# Patient Record
(demographics unavailable — no encounter records)

---

## 2017-01-25 NOTE — ED.PDOC
History of Present Illness





- General


Chief Complaint: General


Stated Complaint: Feels hot, but denies fever


Time Seen by Provider: 17 18:17


Source: patient


Exam Limitations: no limitations





- History of Present Illness


Initial Comments: 


Patient presents with a complaint of "hot flashes" this past week. She says she 

feels hot in her face with occasional mild headache. She felt "light-headed" 

yesterday after standing.  + subjective fever. No nasal exudates nor 

congestion. No ST.  Denies coughing or sneezing.  No sick contacts. No other 

complaints. 


Timing/Duration: 1 week


Severity: mild


Improving Factors: nothing


Worsening Factors: nothing


Associated Symptoms: denies symptoms


Allergies/Adverse Reactions: 


Allergies





NO KNOWN ALLERGY Allergy (Verified 13 11:15)


 











Review of Systems





- Review of Systems


Constitutional: States: no symptoms reported


EENTM: States: no symptoms reported


Respiratory: States: no symptoms reported


Cardiology: States: no symptoms reported


Gastrointestinal/Abdominal: States: no symptoms reported


Genitourinary: States: no symptoms reported


Musculoskeletal: States: no symptoms reported


Skin: States: no symptoms reported


Neurological: States: no symptoms reported


Endocrine: States: no symptoms reported


Hematologic/Lymphatic: States: no symptoms reported





Past Medical History (General)





- Patient Medical History


Hx Seizures: No


Hx Stroke: No


Hx Asthma: No


Hx of COPD: No


Hx Cardiac Disorders: No


Hx Congestive Heart Failure: No


Hx Pacemaker: No


Hx Hypertension: No


Hx Diabetes: No


Hx Cancer: No


Hx Hepatitis C: No


Hx MRSA: No





- Vaccination History


Hx Tetanus, Diphtheria Vaccination: No


Hx Influenza Vaccination: No


Hx Pneumococcal Vaccination: No


Immunizations Up to Date: Yes





- Social History


Hx Tobacco Use: No


Hx Chewing Tobacco Use: No


Hx Alcohol Use: Yes - Occas


Hx Substance Use: No


Hx Substance Use Treatment: No


Hx Depression: No


Feels Threatened In Home Enviroment: No


Feels Threatened In a Relationship: No


Hx Physical Abuse: No


Hx Emotional Abuse: No


Hx Suspected Abuse: No





- Activities of Daily Living


Hospice Agency (if applicable):: None





- Female History


Patient is a Female of Child Bearing Age (10 -59 yrs old): Yes


Hx Last Menstrual Period: 14


Patient Pregnant: No





Family Medical History





- Family History


  ** Father


Family History: Unknown


Living Status: 


Age at Death (years of age): 59


Hx Family Cancer: Yes





Physical Exam





- Physical Exam


General Appearance: Alert


Eye Exam: bilateral normal


Ears, Nose, Throat: normal ENT inspection


Neck: non-tender, full range of motion, supple


Respiratory: lungs clear


Cardiovascular/Chest: normal peripheral pulses, regular rate, rhythm, no edema


Gastrointestinal/Abdominal: normal bowel sounds, non tender, soft


Rectal Exam: normal exam


Back Exam: normal inspection, no CVA tenderness


Extremity: normal range of motion, non-tender, normal inspection


Neurologic: CNs II-XII nml as tested, no motor/sensory deficits, alert, normal 

mood/affect, oriented x 3


Skin Exam: normal color


Lymphatic: no adenopathy





Progress





- Progress


Progress: 





17 20:09


Labs unremarkable.  Patient referred to outpatient physician for further workup 

of hot flashes. 





 Laboratory Tests











  17





  18:40 19:45


 


WBC  6.8 


 


RBC  4.45 


 


Hgb  13.5 


 


Hct  40.4 


 


MCV  90.7 


 


MCH  30.3 


 


MCHC  33.4 


 


RDW  12.8 


 


Plt Count  217 


 


MPV  9.4 


 


Absolute Neuts (auto)  3.90 


 


Absolute Lymphs (auto)  2.20 


 


Absolute Monos (auto)  0.60 


 


Absolute Eos (auto)  0.10 


 


Absolute Basos (auto)  0.00 


 


Neutrophils %  57.1 


 


Lymphocytes %  32.0 


 


Monocytes %  9.4 H 


 


Eosinophils %  1.1 


 


Basophils %  0.4 


 


Sodium  138 


 


Potassium  4.0 


 


Chloride  106 


 


Carbon Dioxide  27 


 


Anion Gap  9.0 L 


 


BUN  10 


 


Creatinine  0.70 


 


BUN/Creatinine Ratio  14.3 


 


Random Glucose  85 


 


Serum Osmolality  274.0 L 


 


Calcium  9.1 


 


Total Bilirubin  0.5 


 


AST  14 


 


ALT  11 


 


Alkaline Phosphatase  40 L 


 


Serum Total Protein  8.1 


 


Albumin  4.3 


 


Globulin  3.8 H 


 


Albumin/Globulin Ratio  1.1 


 


Serum HCG, Qual  Negative 


 


Urine Color   Yellow


 


Urine Appearance   Cloudy


 


Urine pH   6.5


 


Ur Specific Gravity   1.025


 


Urine Protein   Negative


 


Urine Glucose (UA)   Negative


 


Urine Ketones   Negative


 


Urine Blood   Moderate H


 


Urine Nitrite   Negative


 


Urine Bilirubin   Negative


 


Urine Urobilinogen   2.0 H


 


Ur Leukocyte Esterase   Negative


 


Urine RBC   0-1


 


Urine WBC   0


 


Ur Epithelial Cells   10-20


 


Urine Bacteria   0














Departure





- Departure


Clinical Impression: 


 The IMO content you are accessing is 14 months old.  To get updated IMO content

, please contact your IT Dept/Help Desk requesting the latest release. IT Dept/

Help Desk- Please refer to our FAQ page ([email or web address]

[email or web address] or contact [employer] Customer Support at [email or web address]


Disposition: Discharge to Home or Self Care


Condition: Good


Departure Forms:  ED Discharge - Pt. Copy, Patient Portal Self Enrollment


Diet: resume usual diet


Activity: increase activity as tolerated


Additional Instructions: 


Follow up with primary care physician for further workup of your hot flashes.

## 2017-02-06 NOTE — ED.PDOC
History of Present Illness





- General


Chief Complaint: General


Stated Complaint: Right flank pain


Time Seen by Provider: 17 08:42


Source: patient, RN notes reviewed





- History of Present Illness


Initial Comments: 


Patient is a 34 y/o female who has had right flank pain for the past 4 days.  

She was previously seen in the ED for hot flashes which she says she has when 

she has a UTI.  Since that time, she has had dysuria which has resolved, 

however she now has sever right flank pain which is sharp.  No fever/chills.


Timing/Duration: other - 4 days


Severity: severe


Improving Factors: nothing


Worsening Factors: movement


Associated Symptoms: denies symptoms


Allergies/Adverse Reactions: 


Allergies





NO KNOWN ALLERGY Allergy (Verified 13 11:15)


 








Home Medications: 


Ambulatory Orders





Ciprofloxacin [Cipro] 500 mg PO BID #14 tab 17 











Review of Systems





- Review of Systems


Constitutional: States: no symptoms reported


EENTM: States: no symptoms reported


Respiratory: States: no symptoms reported


Cardiology: States: no symptoms reported


Gastrointestinal/Abdominal: States: no symptoms reported


Genitourinary: States: dysuria, pain


Musculoskeletal: States: back pain


Skin: States: no symptoms reported


Neurological: States: no symptoms reported


Endocrine: States: other - hot flashes


Hematologic/Lymphatic: States: no symptoms reported


All other Systems: Reviewed and Negative





Past Medical History (General)





- Patient Medical History


Hx Seizures: No


Hx Stroke: No


Hx Asthma: No


Hx of COPD: No


Hx Cardiac Disorders: No


Hx Congestive Heart Failure: No


Hx Pacemaker: No


Hx Hypertension: No


Hx Diabetes: No


Hx Cancer: No


Hx Hepatitis C: No


Hx MRSA: No





- Vaccination History


Hx Tetanus, Diphtheria Vaccination: No


Hx Influenza Vaccination: No


Hx Pneumococcal Vaccination: No





- Social History


Hx Tobacco Use: No


Hx Chewing Tobacco Use: No


Hx Alcohol Use: Yes - Occas


Hx Substance Use: No


Hx Substance Use Treatment: No


Hx Depression: No


Hx Physical Abuse: No


Hx Emotional Abuse: No


Hx Suspected Abuse: No





- Female History


Hx Last Menstrual Period: 14


Patient Pregnant: No





Family Medical History





- Family History


  ** Father


Family History: Unknown


Living Status: 


Age at Death (years of age): 59


Hx Family Cancer: Yes





Physical Exam





- Physical Exam


General Appearance: Obvious distress


Ears, Nose, Throat: hearing grossly normal


Neck: full range of motion


Respiratory: lungs clear, normal breath sounds, no respiratory distress, no 

accessory muscle use


Cardiovascular/Chest: regular rate, rhythm, no edema, no gallop, no murmur


Gastrointestinal/Abdominal: normal bowel sounds, soft, no organomegaly, 

tenderness - right flank


Back Exam: CVA tenderness (R)


Extremity: normal range of motion, non-tender, normal inspection, no pedal edema


Skin Exam: normal color, warm/dry





Progress





- Results/Orders


Results/Orders: 


 





17 09:12


URINE CULTURE W/COLONY COUNT Stat 








 Laboratory Results











Serum HCG, Qual  Negative   17  09:00    


 


Urine Color  Yellow  (Yellow)   17  09:12    


 


Urine Appearance  Sl cloudy  (Clear)   17  09:12    


 


Urine pH  7.5  (4.5-7.8)   17  09:12    


 


Ur Specific Gravity  1.020  (1.005-1.030)   17  09:12    


 


Urine Protein  30 mg/dL  17  09:12    


 


Urine Glucose (UA)  Negative mg/dL (Negative)   17  09:12    


 


Urine Ketones  Negative mg/dL (NEGATIVE)   17  09:12    


 


Urine Blood  Trace-lysed  (Negative)  H  17  09:12    


 


Urine Nitrite  Positive  H  17  09:12    


 


Urine Bilirubin  Negative  (NEGATIVE)   17  09:12    


 


Urine Urobilinogen  0.2 mg/dL (0.2-1.0)   17  09:12    


 


Ur Leukocyte Esterase  Small  (Negative)  H  17  09:12    


 


Urine RBC  5-10 /hpf H  17  09:12    


 


Urine WBC  30-40 /hpf H  17  09:12    


 


Ur Epithelial Cells  1-3 /hpf  17  09:12    


 


Urine Bacteria  3+  H  17  09:12    














Departure





- Departure


Clinical Impression: 


 Kidney infection


Time of Disposition: 10:18


Disposition: Discharge to Home or Self Care


Condition: Fair


Departure Forms:  ED Discharge - Pt. Copy, Patient Portal Self Enrollment


Instructions:  Kidney Infection, DI for Kidney Infection


Diet: resume usual diet


Prescriptions: 


Ciprofloxacin [Cipro] 500 mg PO BID #14 tab


Home Medications: 


Ambulatory Orders





Ciprofloxacin [Cipro] 500 mg PO BID #14 tab 17 








Additional Instructions: 


FOllow up if symptoms persist or worsen.

## 2018-10-20 NOTE — US
EXAM DESCRIPTION: 

Pelvis Transvaginal: Ultrasound.



CLINICAL HISTORY: 

CYST ON OVARIES.  7, para 4, SAB 2, TAB 1. LMP 10/4/2018.



COMPARISON: 

Pelvic ultrasound 2014.



TECHNIQUE: 

Endovaginal scanning; Gray-scale and Doppler modes.



FINDINGS: 

Uterus 10.0 x 6.1 x 3.9 cm. Volume 123.7 mL. Endometrial

thickness is 8.1 mm. Minimal fluid. Myometrium appears

homogeneous. Uterus  not retroflexed. Cervix multiple cysts,

largest measures 5.8 mm.. Cul-de-sac contains no fluid.

Right ovary 4.4 x 3.7 x 3.2 cm. Normal waveform and color Doppler

vascularity. Complex cyst measuring 3.2 x 3.0 x 2.7, not

vascular. No adnexal mass or free fluid. 

Left ovary 2.0 x 1.3 x 1.1 cm. Normal waveform and color Doppler

vascularity. No follicles or cysts. No adnexal mass or free

fluid.



IMPRESSION: 

1. Complex right ovarian cyst with maximum diameter 3.2 cm. If

cyst <= 7cm, recommend pelvic US follow-up in 6-12 weeks; if

unchanged, continue f/u with US OR MRI w/IV contrast - if f/u

studies do not confirm endometrioma or dermoid, consider surgical

evaluation. If cyst > 7cm, recommend MRI w/IV contrast or

surgical evaluation.

Reference: Radiology 2010 Sep;256(3):943-54.

2. No endometrial thickening but minimal fluid. Nabothian cysts

in the cervix. Uterus upper normal limits in volume. Normal

position.



Electronically signed by:  Seven Napier MD  10/20/2018 7:29 AM

CDT Workstation: [id number]

## 2019-01-24 NOTE — RAD
EXAM DESCRIPTION: Chest,2 Views



CLINICAL HISTORY:35 years Female, cough and fever



Comparison: November 9, 2016



FINDINGS:

No focal lung consolidation.

No pleural effusion. No pneumothorax.

Cardiac and mediastinal silhouette is unremarkable.

No acute osseous abnormality. 

Soft tissues are unremarkable. 



IMPRESSION:

No acute findings. No focal lung consolidation.



Electronically signed by:  Russell Camp MD  1/24/2019 6:27 PM

[facility] Workstation: [id number]

## 2019-01-24 NOTE — ED.PDOC
History of Present Illness





- General


Chief Complaint: General


Time Seen by Provider: 19 17:57


Source: patient


Exam Limitations: no limitations





- History of Present Illness


Initial Comments: 





Patient presents with a fever and cough for three days.  The cough is non-

productive.  She denies sore throat.  She says that she had a fever earlier this

month as well and thinks that it only stopped for one day.  Today, she developed

N/V/D. Denies abdominal pain.  No rhinorrhea nor congestion. Denies dyspnea.  No

other complaints. 


Timing/Duration: intermittent


Severity: moderate


Improving Factors: nothing


Worsening Factors: nothing


Associated Symptoms: other - as in HPI


Allergies/Adverse Reactions: 


Allergies





NO KNOWN ALLERGY Allergy (Verified 13 11:15)


   








Home Medications: 


Ambulatory Orders





Ciprofloxacin [Cipro] 500 mg PO BID #14 tab 17 











Review of Systems





- Review of Systems


Constitutional: States: see HPI, fever


EENTM: States: see HPI


Respiratory: States: see HPI


Cardiology: States: no symptoms reported


Gastrointestinal/Abdominal: States: see HPI


Genitourinary: States: no symptoms reported


Musculoskeletal: States: no symptoms reported


Skin: States: no symptoms reported


Neurological: States: no symptoms reported


Endocrine: States: no symptoms reported


Hematologic/Lymphatic: States: no symptoms reported





Past Medical History (General)





- Patient Medical History


Hx Seizures: No


Hx Stroke: No


Hx Asthma: No


Hx of COPD: No


Hx Cardiac Disorders: No


Hx Congestive Heart Failure: No


Hx Pacemaker: No


Hx Hypertension: No


Hx Diabetes: No


Hx Cancer: No


Hx Hepatitis C: No


Hx MRSA: No


Surgical History: other





- Vaccination History


Hx Tetanus, Diphtheria Vaccination: No


Hx Influenza Vaccination: No


Hx Pneumococcal Vaccination: No





- Social History


Hx Tobacco Use: No


Hx Chewing Tobacco Use: No


Hx Alcohol Use: Yes - Rare


Hx Substance Use: No


Hx Substance Use Treatment: No


Hx Depression: No


Hx Physical Abuse: No


Hx Emotional Abuse: No


Hx Suspected Abuse: No





- Female History


Patient is a Female of Child Bearing Age (10 -59 yrs old): Yes


Hx Last Menstrual Period: 14


Patient Pregnant: No





Family Medical History





- Family History


  ** Father


Family History: Unknown


Living Status: 


Age at Death (years of age): 59


Hx Family Cancer: Yes





Physical Exam





- Physical Exam


General Appearance: Alert


Ears, Nose, Throat: normal ENT inspection


Neck: non-tender, full range of motion, supple


Respiratory: lungs clear, normal breath sounds


Cardiovascular/Chest: normal peripheral pulses, regular rate, rhythm, no edema


Gastrointestinal/Abdominal: normal bowel sounds, non tender, soft


Back Exam: normal inspection, no CVA tenderness


Neurologic: no motor/sensory deficits, alert, normal mood/affect, oriented x 3


Skin Exam: normal color


Lymphatic: no adenopathy





Progress





- Progress


Progress: 





19 22:38


                                Laboratory Tests











  19





  18:06 18:16 18:16


 


WBC   5.4 


 


RBC   4.22 


 


Hgb   12.9 


 


Hct   38.1 


 


MCV   90.4 


 


MCH   30.7 


 


MCHC   33.9 


 


RDW   12.3 


 


Plt Count   157 


 


MPV   10.1 


 


Absolute Neuts (auto)   3.40 


 


Absolute Lymphs (auto)   0.90 L 


 


Absolute Monos (auto)   1.20 H 


 


Absolute Eos (auto)   0.00 


 


Absolute Basos (auto)   0.00 


 


Neutrophils %   61.8 


 


Lymphocytes %   16.6 L 


 


Monocytes %   21.2 H 


 


Eosinophils %   0.1 L 


 


Basophils %   0.3 


 


Sodium    135


 


Potassium    3.3 L


 


Chloride    105


 


Carbon Dioxide    21


 


Anion Gap    12.3


 


BUN    8


 


Creatinine    0.66


 


BUN/Creatinine Ratio    12.1


 


Random Glucose    96


 


Serum Osmolality    268.3 L


 


Calcium    8.6


 


Urine Color   


 


Urine Appearance   


 


Urine pH   


 


Ur Specific Gravity   


 


Urine Protein   


 


Urine Glucose (UA)   


 


Urine Ketones   


 


Urine Blood   


 


Urine Nitrite   


 


Urine Bilirubin   


 


Urine Urobilinogen   


 


Ur Leukocyte Esterase   


 


Urine RBC   


 


Urine WBC   


 


Ur Epithelial Cells   


 


Urine Bacteria   


 


Urine HCG, Qual   


 


Group A Strep Rapid  Negative  














  19





  20:10 20:57


 


WBC  


 


RBC  


 


Hgb  


 


Hct  


 


MCV  


 


MCH  


 


MCHC  


 


RDW  


 


Plt Count  


 


MPV  


 


Absolute Neuts (auto)  


 


Absolute Lymphs (auto)  


 


Absolute Monos (auto)  


 


Absolute Eos (auto)  


 


Absolute Basos (auto)  


 


Neutrophils %  


 


Lymphocytes %  


 


Monocytes %  


 


Eosinophils %  


 


Basophils %  


 


Sodium  


 


Potassium  


 


Chloride  


 


Carbon Dioxide  


 


Anion Gap  


 


BUN  


 


Creatinine  


 


BUN/Creatinine Ratio  


 


Random Glucose  


 


Serum Osmolality  


 


Calcium  


 


Urine Color   Yellow


 


Urine Appearance   Clear


 


Urine pH   7.0


 


Ur Specific Gravity   1.020


 


Urine Protein   Trace


 


Urine Glucose (UA)   Negative


 


Urine Ketones   >=160


 


Urine Blood   Negative


 


Urine Nitrite   Negative


 


Urine Bilirubin   Small H


 


Urine Urobilinogen   2.0 H


 


Ur Leukocyte Esterase   Negative


 


Urine RBC   0-1


 


Urine WBC   3-5 H


 


Ur Epithelial Cells   >50


 


Urine Bacteria   0


 


Urine HCG, Qual  Negative 


 


Group A Strep Rapid  








Patient had vomiting x one in the E.D.  She was given IV fluids but her blood 

pressure trended down to 88 systolic and her fever continued to rise.  The blood

 pressure drop did not seem to come from just a loss of fluids because of her 

limited vomiting and the fact that she was taking in oral fluids while here.  

Her influenza A was positive. CXR unremarkable. It is possible that this could 

be viral sepsis due to influenza.  wbc was wnl.  Blood cultures were draw, 

Levaquin 750 mg IV x one given, Tamiflu 75 mg po x one given ,and fluids 

continued. I was concerned about the small amount of protein in the urine and 

whether that represented end-organ damage or was just from the epithelia cells 

in the urine.  CMP and lactic acid was ordered. 


19 23:04


Lactic acid was 1.0. No signs of end-organ damage on CMP.  Patient to be 

transferred to [facility] since influenza cannot be ruled out at this time 

as a cause for the hypotension and because of the high mortality with viral 

sepsis. Patient was in agreement with this plan.





Departure





- Departure


Clinical Impression: 


 Influenza A, Hypotension





Disposition: Transfer to Hospital


Condition: Fair


Departure Forms:  ED Discharge - Pt. Copy, Patient Portal Self Enrollment


Diet: other - as per hospitalist


Activity: increase activity as tolerated


Referrals: 


Mattie Juarez FNP [Primary Care Provider] - 1-2 Weeks


Home Medications: 


Ambulatory Orders





Ciprofloxacin [Cipro] 500 mg PO BID #14 tab 17

## 2019-09-29 NOTE — RAD
EXAM DESCRIPTION:

XR Chest,1 View



CLINICAL HISTORY:

36 years Female, sob



COMPARISON:

2 view chest dated January 24, 2019.



FINDINGS:

Heart size and mediastinal and hilar structures appear

essentially within normal limits. The lungs appear essentially

clear. Minimal linear stranding noted in the left base. Equivocal

minimal hyperinflation versus a slightly deeper inspiratory

effort on the current examination. There is no evidence of

pneumothorax on this upright portable film. Overall, no

significant appearing interval change is seen. Regional bony

structures appear intact as visualized.



IMPRESSION:

No radiographic evidence of acute cardiopulmonary disease.



Electronically signed by:  Elliott Betts MD  9/29/2019 6:51

PM CDT Workstation: [id number]

## 2019-09-29 NOTE — ED.PDOC
History of Present Illness





- General


Chief Complaint: Respiratory Problem


Time Seen by Provider: 19 18:24





- History of Present Illness


Comments: 





c/o cough with fever and some sob since 2 days : now getting worse : no wheezing




Allergies/Adverse Reactions: 


Allergies





NO KNOWN ALLERGY Allergy (Verified 13 11:15)


   








Review of Systems





- Review of Systems


Constitutional: States: fever


EENTM: States: no symptoms reported


Respiratory: States: see HPI


Cardiology: States: no symptoms reported


Gastrointestinal/Abdominal: States: no symptoms reported


Genitourinary: States: no symptoms reported


Musculoskeletal: States: no symptoms reported


Skin: States: no symptoms reported


Neurological: States: no symptoms reported


Endocrine: States: no symptoms reported


Hematologic/Lymphatic: States: no symptoms reported


All other Systems: Reviewed and Negative





Past Medical History (General)





- Patient Medical History


Hx Seizures: No


Hx Stroke: No


Hx Asthma: No


Hx of COPD: No


Hx Cardiac Disorders: No


Hx Congestive Heart Failure: No


Hx Pacemaker: No


Hx Hypertension: No


Hx Diabetes: No


Hx Cancer: No


Hx Hepatitis C: No


Hx MRSA: No


Surgical History: other





- Vaccination History


Hx Tetanus, Diphtheria Vaccination: No


Hx Influenza Vaccination: No


Hx Pneumococcal Vaccination: No





- Social History


Hx Tobacco Use: No


Hx Chewing Tobacco Use: No


Hx Alcohol Use: Yes - Rare


Hx Substance Use: No


Hx Substance Use Treatment: No


Hx Depression: No


Hx Physical Abuse: No


Hx Emotional Abuse: No


Hx Suspected Abuse: No





- Female History


Hx Last Menstrual Period: 14


Patient Pregnant: No





Family Medical History





- Family History


  ** Father


Family History: Unknown


Living Status: 


Age at Death (years of age): 59


Hx Family Cancer: Yes





Physical Exam





- Physical Exam


General Appearance: Alert, Comfortable, Well Developed, Well Groomed, Well 

Hydrated, Well Nourished


ENT Exam: normal ENT inspection


Neck: non-tender, full range of motion, supple


Respiratory: chest non-tender, lungs clear, normal breath sounds, no respiratory

distress, no accessory muscle use


Cardiovascular/Chest: normal peripheral pulses, regular rate, rhythm, no edema


Gastrointestinal/Abdominal: non tender


Extremity: normal range of motion, non-tender, normal inspection, no pedal edema


Neurologic: no motor/sensory deficits, alert, normal mood/affect, oriented x 3





Departure





- Departure


Clinical Impression: 


 Upper respiratory infection





Disposition: Discharge to Home or Self Care


Departure Forms:  ED Discharge - Pt. Copy, Patient Portal Self Enrollment


Diet: resume usual diet


Activity: increase activity as tolerated, walking as tolerated


Referrals: 


Qing Castillo NP [Primary Care Provider] - 1-2 Weeks